# Patient Record
Sex: MALE | Race: WHITE | NOT HISPANIC OR LATINO | URBAN - METROPOLITAN AREA
[De-identification: names, ages, dates, MRNs, and addresses within clinical notes are randomized per-mention and may not be internally consistent; named-entity substitution may affect disease eponyms.]

---

## 2017-04-10 ENCOUNTER — FOLLOW UP ESTABLISHED (OUTPATIENT)
Dept: URBAN - METROPOLITAN AREA CLINIC 64 | Facility: CLINIC | Age: 57
End: 2017-04-10
Payer: COMMERCIAL

## 2017-04-10 DIAGNOSIS — H52.01 HYPERMETROPIA, RIGHT EYE: Primary | ICD-10-CM

## 2017-04-10 PROCEDURE — 92014 COMPRE OPH EXAM EST PT 1/>: CPT | Performed by: OPTOMETRIST

## 2017-04-10 PROCEDURE — 92015 DETERMINE REFRACTIVE STATE: CPT | Performed by: OPTOMETRIST

## 2017-04-10 ASSESSMENT — KERATOMETRY
OD: 45.34
OS: 45.09

## 2017-04-10 ASSESSMENT — INTRAOCULAR PRESSURE
OD: 12
OS: 12

## 2017-04-10 ASSESSMENT — VISUAL ACUITY
OD: 20/20
OS: 20/20

## 2018-04-06 ENCOUNTER — FOLLOW UP ESTABLISHED (OUTPATIENT)
Dept: URBAN - METROPOLITAN AREA CLINIC 64 | Facility: CLINIC | Age: 58
End: 2018-04-06
Payer: COMMERCIAL

## 2018-04-06 DIAGNOSIS — H52.223 REGULAR ASTIGMATISM, BILATERAL: Primary | ICD-10-CM

## 2018-04-06 PROCEDURE — 92014 COMPRE OPH EXAM EST PT 1/>: CPT | Performed by: OPTOMETRIST

## 2018-04-06 PROCEDURE — 92015 DETERMINE REFRACTIVE STATE: CPT | Performed by: OPTOMETRIST

## 2018-04-06 ASSESSMENT — INTRAOCULAR PRESSURE
OD: 18
OS: 15

## 2018-04-06 ASSESSMENT — VISUAL ACUITY
OS: 20/20
OD: 20/20

## 2018-04-06 ASSESSMENT — KERATOMETRY
OS: 45.24
OD: 45.49

## 2019-06-27 ENCOUNTER — FOLLOW UP ESTABLISHED (OUTPATIENT)
Dept: URBAN - METROPOLITAN AREA CLINIC 64 | Facility: CLINIC | Age: 59
End: 2019-06-27
Payer: COMMERCIAL

## 2019-06-27 PROCEDURE — 92015 DETERMINE REFRACTIVE STATE: CPT | Performed by: OPTOMETRIST

## 2019-06-27 PROCEDURE — 92014 COMPRE OPH EXAM EST PT 1/>: CPT | Performed by: OPTOMETRIST

## 2019-06-27 ASSESSMENT — INTRAOCULAR PRESSURE
OD: 14
OS: 16

## 2019-06-27 ASSESSMENT — KERATOMETRY
OD: 45.30
OS: 45.09

## 2019-06-27 ASSESSMENT — VISUAL ACUITY
OD: 20/25
OS: 20/20

## 2020-10-23 ENCOUNTER — FOLLOW UP ESTABLISHED (OUTPATIENT)
Dept: URBAN - METROPOLITAN AREA CLINIC 64 | Facility: CLINIC | Age: 60
End: 2020-10-23
Payer: COMMERCIAL

## 2020-10-23 DIAGNOSIS — H52.13 MYOPIA, BILATERAL: Primary | ICD-10-CM

## 2020-10-23 PROCEDURE — 92014 COMPRE OPH EXAM EST PT 1/>: CPT | Performed by: OPTOMETRIST

## 2020-10-23 PROCEDURE — 92015 DETERMINE REFRACTIVE STATE: CPT | Performed by: OPTOMETRIST

## 2020-10-23 ASSESSMENT — VISUAL ACUITY
OS: 20/20
OD: 20/25

## 2020-10-23 ASSESSMENT — KERATOMETRY
OS: 45.27
OD: 45.49

## 2020-10-23 ASSESSMENT — INTRAOCULAR PRESSURE
OD: 10
OS: 12

## 2021-12-10 ENCOUNTER — OFFICE VISIT (OUTPATIENT)
Dept: URBAN - METROPOLITAN AREA CLINIC 64 | Facility: CLINIC | Age: 61
End: 2021-12-10
Payer: COMMERCIAL

## 2021-12-10 DIAGNOSIS — H52.221 REGULAR ASTIGMATISM, RIGHT EYE: Primary | ICD-10-CM

## 2021-12-10 PROCEDURE — 92014 COMPRE OPH EXAM EST PT 1/>: CPT | Performed by: OPTOMETRIST

## 2021-12-10 ASSESSMENT — VISUAL ACUITY
OS: 20/20
OD: 20/20

## 2021-12-10 ASSESSMENT — INTRAOCULAR PRESSURE
OS: 16
OD: 12

## 2021-12-10 ASSESSMENT — KERATOMETRY
OS: 45.24
OD: 45.30

## 2021-12-10 NOTE — IMPRESSION/PLAN
Impression: Regular astigmatism, right eye: H52.221. Plan: Very mild. Ok to continue with OTC readers.

## 2023-02-01 ENCOUNTER — OFFICE VISIT (OUTPATIENT)
Dept: URBAN - METROPOLITAN AREA CLINIC 64 | Facility: CLINIC | Age: 63
End: 2023-02-01
Payer: COMMERCIAL

## 2023-02-01 DIAGNOSIS — H52.4 PRESBYOPIA: Primary | ICD-10-CM

## 2023-02-01 DIAGNOSIS — H16.223 KERATOCONJUNCTIVITIS SICCA, NOT SPECIFIED AS SJÖGREN'S, BILATERAL: ICD-10-CM

## 2023-02-01 PROCEDURE — 92014 COMPRE OPH EXAM EST PT 1/>: CPT

## 2023-02-01 ASSESSMENT — KERATOMETRY
OD: 45.22
OS: 45.40

## 2023-02-01 ASSESSMENT — INTRAOCULAR PRESSURE
OD: 13
OS: 11

## 2023-02-01 ASSESSMENT — VISUAL ACUITY
OS: 20/20
OD: 20/20

## 2024-02-01 ENCOUNTER — OFFICE VISIT (OUTPATIENT)
Dept: URBAN - METROPOLITAN AREA CLINIC 64 | Facility: LOCATION | Age: 64
End: 2024-02-01
Payer: COMMERCIAL

## 2024-02-01 DIAGNOSIS — H25.813 COMBINED FORMS OF AGE-RELATED CATARACT, BILATERAL: ICD-10-CM

## 2024-02-01 DIAGNOSIS — H52.4 PRESBYOPIA: Primary | ICD-10-CM

## 2024-02-01 PROCEDURE — 92014 COMPRE OPH EXAM EST PT 1/>: CPT | Performed by: OPTOMETRIST

## 2024-02-01 ASSESSMENT — INTRAOCULAR PRESSURE
OS: 15
OD: 13

## 2024-02-01 ASSESSMENT — KERATOMETRY: OD: 45.84

## 2025-02-27 ENCOUNTER — OFFICE VISIT (OUTPATIENT)
Dept: URBAN - METROPOLITAN AREA CLINIC 64 | Facility: LOCATION | Age: 65
End: 2025-02-27
Payer: COMMERCIAL

## 2025-02-27 DIAGNOSIS — H25.813 COMBINED FORMS OF AGE-RELATED CATARACT, BILATERAL: ICD-10-CM

## 2025-02-27 DIAGNOSIS — H52.4 PRESBYOPIA: Primary | ICD-10-CM

## 2025-02-27 PROCEDURE — 99214 OFFICE O/P EST MOD 30 MIN: CPT | Performed by: OPTOMETRIST

## 2025-02-27 ASSESSMENT — INTRAOCULAR PRESSURE
OS: 14
OD: 16